# Patient Record
Sex: FEMALE | Race: WHITE | NOT HISPANIC OR LATINO | URBAN - METROPOLITAN AREA
[De-identification: names, ages, dates, MRNs, and addresses within clinical notes are randomized per-mention and may not be internally consistent; named-entity substitution may affect disease eponyms.]

---

## 2017-02-14 ENCOUNTER — APPOINTMENT (OUTPATIENT)
Dept: ULTRASOUND IMAGING | Facility: HOSPITAL | Age: 47
End: 2017-02-14
Attending: INTERNAL MEDICINE

## 2017-02-14 ENCOUNTER — APPOINTMENT (OUTPATIENT)
Dept: GENERAL RADIOLOGY | Facility: HOSPITAL | Age: 47
End: 2017-02-14

## 2017-02-14 ENCOUNTER — APPOINTMENT (OUTPATIENT)
Dept: CT IMAGING | Facility: HOSPITAL | Age: 47
End: 2017-02-14
Attending: EMERGENCY MEDICINE

## 2017-02-14 ENCOUNTER — HOSPITAL ENCOUNTER (OUTPATIENT)
Facility: HOSPITAL | Age: 47
Setting detail: OBSERVATION
Discharge: HOME | End: 2017-02-15
Attending: EMERGENCY MEDICINE | Admitting: HOSPITALIST

## 2017-02-14 DIAGNOSIS — J90 PLEURAL EFFUSION, LEFT: ICD-10-CM

## 2017-02-14 DIAGNOSIS — R07.89 LEFT-SIDED CHEST WALL PAIN: Primary | ICD-10-CM

## 2017-02-14 PROBLEM — R91.8 PULMONARY INFILTRATE ON CHEST X-RAY: Status: ACTIVE | Noted: 2017-02-14

## 2017-02-14 PROBLEM — R07.81 PLEURITIC CHEST PAIN: Status: ACTIVE | Noted: 2017-02-14

## 2017-02-14 PROBLEM — J84.10 CALCIFIED GRANULOMA OF LUNG (HCC): Status: ACTIVE | Noted: 2017-02-14

## 2017-02-14 LAB
ALBUMIN FLD-MCNC: 3.1 G/DL
ALBUMIN SERPL-MCNC: 4.3 G/DL (ref 3.5–5.2)
ALBUMIN/GLOB SERPL: 1.7 G/DL
ALP SERPL-CCNC: 55 U/L (ref 39–117)
ALT SERPL W P-5'-P-CCNC: 12 U/L (ref 1–33)
ANION GAP SERPL CALCULATED.3IONS-SCNC: 8.2 MMOL/L
APPEARANCE FLD: ABNORMAL
AST SERPL-CCNC: 13 U/L (ref 1–32)
BASOPHILS # BLD AUTO: 0.01 10*3/MM3 (ref 0–0.2)
BASOPHILS NFR BLD AUTO: 0.2 % (ref 0–1.5)
BILIRUB SERPL-MCNC: 0.2 MG/DL (ref 0.1–1.2)
BUN BLD-MCNC: 13 MG/DL (ref 6–20)
BUN/CREAT SERPL: 16.7 (ref 7–25)
CALCIUM SPEC-SCNC: 8.7 MG/DL (ref 8.6–10.5)
CHLORIDE SERPL-SCNC: 104 MMOL/L (ref 98–107)
CO2 SERPL-SCNC: 28.8 MMOL/L (ref 22–29)
COLOR FLD: ABNORMAL
CREAT BLD-MCNC: 0.78 MG/DL (ref 0.57–1)
D DIMER PPP FEU-MCNC: <0.27 MCGFEU/ML (ref 0–0.49)
DEPRECATED RDW RBC AUTO: 43.8 FL (ref 37–54)
EOSINOPHIL # BLD AUTO: 0.23 10*3/MM3 (ref 0–0.7)
EOSINOPHIL NFR BLD AUTO: 3.7 % (ref 0.3–6.2)
ERYTHROCYTE [DISTWIDTH] IN BLOOD BY AUTOMATED COUNT: 13.2 % (ref 11.7–13)
GFR SERPL CREATININE-BSD FRML MDRD: 79 ML/MIN/1.73
GLOBULIN UR ELPH-MCNC: 2.5 GM/DL
GLUCOSE BLD-MCNC: 155 MG/DL (ref 65–99)
GLUCOSE FLD-MCNC: 81 MG/DL
HCG SERPL QL: NEGATIVE
HCT VFR BLD AUTO: 39.1 % (ref 35.6–45.5)
HGB BLD-MCNC: 12.9 G/DL (ref 11.9–15.5)
HOLD SPECIMEN: NORMAL
HOLD SPECIMEN: NORMAL
IMM GRANULOCYTES # BLD: 0 10*3/MM3 (ref 0–0.03)
IMM GRANULOCYTES NFR BLD: 0 % (ref 0–0.5)
LDH FLD-CCNC: 227 U/L
LYMPHOCYTES # BLD AUTO: 1.66 10*3/MM3 (ref 0.9–4.8)
LYMPHOCYTES NFR BLD AUTO: 26.5 % (ref 19.6–45.3)
LYMPHOCYTES NFR FLD MANUAL: 26 %
MCH RBC QN AUTO: 30 PG (ref 26.9–32)
MCHC RBC AUTO-ENTMCNC: 33 G/DL (ref 32.4–36.3)
MCV RBC AUTO: 90.9 FL (ref 80.5–98.2)
METHOD: ABNORMAL
MONOCYTES # BLD AUTO: 0.46 10*3/MM3 (ref 0.2–1.2)
MONOCYTES NFR BLD AUTO: 7.3 % (ref 5–12)
MONOCYTES NFR FLD: 23 %
NEUTROPHILS # BLD AUTO: 3.9 10*3/MM3 (ref 1.9–8.1)
NEUTROPHILS NFR BLD AUTO: 62.3 % (ref 42.7–76)
NEUTROPHILS NFR FLD MANUAL: 51 %
NUC CELL # FLD: 6187 /MM3
PLATELET # BLD AUTO: 257 10*3/MM3 (ref 140–500)
PMV BLD AUTO: 10.5 FL (ref 6–12)
POTASSIUM BLD-SCNC: 3.8 MMOL/L (ref 3.5–5.2)
PROCALCITONIN SERPL-MCNC: 0.05 NG/ML (ref 0.1–0.25)
PROT SERPL-MCNC: 6.8 G/DL (ref 6–8.5)
RBC # BLD AUTO: 4.3 10*6/MM3 (ref 3.9–5.2)
RBC # FLD AUTO: ABNORMAL /MM3
SODIUM BLD-SCNC: 141 MMOL/L (ref 136–145)
TROPONIN T SERPL-MCNC: <0.01 NG/ML (ref 0–0.03)
WBC NRBC COR # BLD: 6.26 10*3/MM3 (ref 4.5–10.7)
WHOLE BLOOD HOLD SPECIMEN: NORMAL
WHOLE BLOOD HOLD SPECIMEN: NORMAL

## 2017-02-14 PROCEDURE — 84145 PROCALCITONIN (PCT): CPT | Performed by: EMERGENCY MEDICINE

## 2017-02-14 PROCEDURE — 76942 ECHO GUIDE FOR BIOPSY: CPT

## 2017-02-14 PROCEDURE — 87070 CULTURE OTHR SPECIMN AEROBIC: CPT | Performed by: HOSPITALIST

## 2017-02-14 PROCEDURE — 0 IOPAMIDOL PER 1 ML: Performed by: EMERGENCY MEDICINE

## 2017-02-14 PROCEDURE — 93010 ELECTROCARDIOGRAM REPORT: CPT | Performed by: INTERNAL MEDICINE

## 2017-02-14 PROCEDURE — 87015 SPECIMEN INFECT AGNT CONCNTJ: CPT | Performed by: HOSPITALIST

## 2017-02-14 PROCEDURE — 85379 FIBRIN DEGRADATION QUANT: CPT | Performed by: EMERGENCY MEDICINE

## 2017-02-14 PROCEDURE — 88305 TISSUE EXAM BY PATHOLOGIST: CPT | Performed by: INTERNAL MEDICINE

## 2017-02-14 PROCEDURE — 87206 SMEAR FLUORESCENT/ACID STAI: CPT | Performed by: HOSPITALIST

## 2017-02-14 PROCEDURE — 71275 CT ANGIOGRAPHY CHEST: CPT

## 2017-02-14 PROCEDURE — 82945 GLUCOSE OTHER FLUID: CPT | Performed by: HOSPITALIST

## 2017-02-14 PROCEDURE — 71020 HC CHEST PA AND LATERAL: CPT

## 2017-02-14 PROCEDURE — G0378 HOSPITAL OBSERVATION PER HR: HCPCS

## 2017-02-14 PROCEDURE — 82042 OTHER SOURCE ALBUMIN QUAN EA: CPT | Performed by: HOSPITALIST

## 2017-02-14 PROCEDURE — 99285 EMERGENCY DEPT VISIT HI MDM: CPT

## 2017-02-14 PROCEDURE — 83615 LACTATE (LD) (LDH) ENZYME: CPT | Performed by: HOSPITALIST

## 2017-02-14 PROCEDURE — 25010000002 KETOROLAC TROMETHAMINE PER 15 MG: Performed by: EMERGENCY MEDICINE

## 2017-02-14 PROCEDURE — 88112 CYTOPATH CELL ENHANCE TECH: CPT | Performed by: INTERNAL MEDICINE

## 2017-02-14 PROCEDURE — 80053 COMPREHEN METABOLIC PANEL: CPT | Performed by: EMERGENCY MEDICINE

## 2017-02-14 PROCEDURE — 87116 MYCOBACTERIA CULTURE: CPT | Performed by: HOSPITALIST

## 2017-02-14 PROCEDURE — 93005 ELECTROCARDIOGRAM TRACING: CPT

## 2017-02-14 PROCEDURE — 25010000002 MORPHINE PER 10 MG: Performed by: EMERGENCY MEDICINE

## 2017-02-14 PROCEDURE — 87205 SMEAR GRAM STAIN: CPT | Performed by: HOSPITALIST

## 2017-02-14 PROCEDURE — 89051 BODY FLUID CELL COUNT: CPT | Performed by: HOSPITALIST

## 2017-02-14 PROCEDURE — 84484 ASSAY OF TROPONIN QUANT: CPT | Performed by: EMERGENCY MEDICINE

## 2017-02-14 PROCEDURE — 85025 COMPLETE CBC W/AUTO DIFF WBC: CPT | Performed by: EMERGENCY MEDICINE

## 2017-02-14 PROCEDURE — 84703 CHORIONIC GONADOTROPIN ASSAY: CPT | Performed by: EMERGENCY MEDICINE

## 2017-02-14 RX ORDER — KETOROLAC TROMETHAMINE 30 MG/ML
30 INJECTION, SOLUTION INTRAMUSCULAR; INTRAVENOUS ONCE
Status: COMPLETED | OUTPATIENT
Start: 2017-02-14 | End: 2017-02-14

## 2017-02-14 RX ORDER — LIDOCAINE WITH 8.4% SOD BICARB 0.9%(10ML)
20 SYRINGE (ML) INJECTION ONCE
Status: COMPLETED | OUTPATIENT
Start: 2017-02-14 | End: 2017-02-14

## 2017-02-14 RX ORDER — MORPHINE SULFATE 2 MG/ML
2 INJECTION, SOLUTION INTRAMUSCULAR; INTRAVENOUS
Status: DISCONTINUED | OUTPATIENT
Start: 2017-02-14 | End: 2017-02-15

## 2017-02-14 RX ORDER — ACETAMINOPHEN 325 MG/1
650 TABLET ORAL EVERY 6 HOURS PRN
Status: DISCONTINUED | OUTPATIENT
Start: 2017-02-14 | End: 2017-02-15 | Stop reason: HOSPADM

## 2017-02-14 RX ORDER — NITROGLYCERIN 0.4 MG/1
0.4 TABLET SUBLINGUAL
Status: DISCONTINUED | OUTPATIENT
Start: 2017-02-14 | End: 2017-02-15 | Stop reason: HOSPADM

## 2017-02-14 RX ORDER — SODIUM CHLORIDE 0.9 % (FLUSH) 0.9 %
10 SYRINGE (ML) INJECTION AS NEEDED
Status: DISCONTINUED | OUTPATIENT
Start: 2017-02-14 | End: 2017-02-15 | Stop reason: HOSPADM

## 2017-02-14 RX ORDER — MORPHINE SULFATE 2 MG/ML
2 INJECTION, SOLUTION INTRAMUSCULAR; INTRAVENOUS ONCE
Status: COMPLETED | OUTPATIENT
Start: 2017-02-14 | End: 2017-02-14

## 2017-02-14 RX ORDER — ONDANSETRON 2 MG/ML
4 INJECTION INTRAMUSCULAR; INTRAVENOUS EVERY 6 HOURS PRN
Status: DISCONTINUED | OUTPATIENT
Start: 2017-02-14 | End: 2017-02-15 | Stop reason: HOSPADM

## 2017-02-14 RX ORDER — HYDROCODONE BITARTRATE AND ACETAMINOPHEN 5; 325 MG/1; MG/1
1 TABLET ORAL EVERY 4 HOURS PRN
Status: DISCONTINUED | OUTPATIENT
Start: 2017-02-14 | End: 2017-02-15 | Stop reason: HOSPADM

## 2017-02-14 RX ORDER — ASPIRIN 325 MG
325 TABLET ORAL ONCE
Status: COMPLETED | OUTPATIENT
Start: 2017-02-14 | End: 2017-02-14

## 2017-02-14 RX ORDER — NALOXONE HCL 0.4 MG/ML
0.4 VIAL (ML) INJECTION
Status: DISCONTINUED | OUTPATIENT
Start: 2017-02-14 | End: 2017-02-15

## 2017-02-14 RX ADMIN — KETOROLAC TROMETHAMINE 30 MG: 30 INJECTION, SOLUTION INTRAMUSCULAR at 08:06

## 2017-02-14 RX ADMIN — Medication 20 ML: at 14:51

## 2017-02-14 RX ADMIN — IOPAMIDOL 95 ML: 755 INJECTION, SOLUTION INTRAVENOUS at 08:36

## 2017-02-14 RX ADMIN — SODIUM CHLORIDE 1000 ML: 9 INJECTION, SOLUTION INTRAVENOUS at 08:07

## 2017-02-14 RX ADMIN — ASPIRIN 325 MG: 325 TABLET ORAL at 08:06

## 2017-02-14 RX ADMIN — HYDROCODONE BITARTRATE AND ACETAMINOPHEN 1 TABLET: 5; 325 TABLET ORAL at 22:41

## 2017-02-14 RX ADMIN — MORPHINE SULFATE 2 MG: 2 INJECTION, SOLUTION INTRAMUSCULAR; INTRAVENOUS at 10:27

## 2017-02-14 RX ADMIN — HYDROCODONE BITARTRATE AND ACETAMINOPHEN 1 TABLET: 5; 325 TABLET ORAL at 18:46

## 2017-02-14 NOTE — H&P
Name: Tiara Dickey ADMIT: 2017   : 1970  PCP: No Known Provider    MRN: [id number] LOS: 0 days   AGE/SEX: 47 y.o. female  ROOM: [facility]/     Chief Complaint   Patient presents with   • Chest Pain   • Shortness of Breath       Subjective   Patient is a 47 y.o. female presents with the following...    Chest Pain    This is a new problem. The current episode started today. The onset quality is sudden. The problem occurs intermittently. The problem has been unchanged. The pain is present in the lateral region (left chest/back). The pain is moderate. The quality of the pain is described as sharp and stabbing. Radiates to: Left scapula. Pertinent negatives include no cough, diaphoresis, exertional chest pressure, irregular heartbeat or nausea. The pain is aggravated by breathing, coughing and deep breathing. She has tried nothing for the symptoms. Risk factors: None. Past medical history comments: She had a respiratory infection in January that lasted less than a week and she didnt seek treatment for it.       History reviewed. No pertinent past medical history.  History reviewed. No pertinent past surgical history.  History reviewed. No pertinent family history.  Social History   Substance Use Topics   • Smoking status: Never Smoker   • Smokeless tobacco: None   • Alcohol use None     No prescriptions prior to admission.     Allergies:  Review of patient's allergies indicates no known allergies.    Review of Systems   Constitutional: Negative.  Negative for diaphoresis.   HENT: Negative.    Eyes: Negative.    Respiratory: Negative for cough.    Cardiovascular: Positive for chest pain.   Gastrointestinal: Negative.  Negative for nausea.   Endocrine: Negative.    Genitourinary: Negative.    Musculoskeletal: Negative.    Skin: Negative.    Neurological: Negative.    Hematological: Negative.    Psychiatric/Behavioral: Negative.         Objective    Vital Signs  Temp:  [98.2 °F (36.8 °C)-98.6 °F (37 °C)]  98.2 °F (36.8 °C)  Heart Rate:  [71-93] 88  Resp:  [16-18] 16  BP: (106-128)/(66-90) 112/71  SpO2:  [96 %-100 %] 96 %  on   ;   O2 Device: room air  Body mass index is 23.17 kg/(m^2).    Physical Exam   Constitutional: She is oriented to person, place, and time. She appears well-developed and well-nourished. No distress.   HENT:   Head: Normocephalic and atraumatic.   Eyes: Conjunctivae and EOM are normal. No scleral icterus.   Neck: Normal range of motion. Neck supple. No tracheal deviation present.   Cardiovascular: Normal rate and regular rhythm.    No murmur heard.  Chest wall normal to inspection and nontender to palpation.   Pulmonary/Chest: Effort normal and breath sounds normal. No respiratory distress. She has no wheezes. She has no rales.   Diminished breath sounds left base   Abdominal: Soft. Bowel sounds are normal. She exhibits no distension and no mass. There is no tenderness.   Musculoskeletal: Normal range of motion. She exhibits no edema or deformity.   Neurological: She is alert and oriented to person, place, and time. No cranial nerve deficit.   Skin: Skin is warm and dry. No rash noted. No erythema.   Psychiatric: Her behavior is normal. Judgment and thought content normal.   Slightly anxious   Nursing note and vitals reviewed.      Results Review:   I reviewed the patient's new clinical results.    Assessment/Plan     Principal Problem:    Pleural effusion, left  Active Problems:    Pleuritic chest pain    Pulmonary infiltrate on chest x-ray    Calcified granuloma of left lower lung      Assessment & Plan  Patient has no obvious signs of sepsis or even infection.  Pro-calcitonin reassuring.  Pleural effusion and associated pleurisy likely the cause of her chest pain.  She needs thoracentesis for diagnostic and therapeutic purposes.  This will be performed this afternoon and will then make further recommendations based on the studies.  Pulmonology will assist her workup.      I discussed the  patients findings and my recommendations with patient, family and consulting provider.          Tam Ortiz MD  [facility]  02/14/17  3:34 PM

## 2017-02-14 NOTE — ED NOTES
C/O left chest/shoulder pain which worsens with deep breath.     Kuldip Shaikh RN  02/14/17 0568

## 2017-02-14 NOTE — CONSULTS
[facility]    Reason for consult: pleural effusion    HPI:  Mrs. Stewart is a 46yo with left mid back pain that started this morning.  It is located on the left below the scapula and radiates to the left axilla.  Worse with movement and deep inspiration.  She has had similar symptoms in the past that resolved spontaneously.  CT chest shows left sided effusion and mild infiltrate, calcified granuloma and no pe or significant lymphadenopathy.  She thought this was a muscular spasm at first, but then she started having some tingling in her left arm and thought she might be having a heart attack so she called EMS.  She reports no hemoptysis, she reports pre-menopausal changes, and recently finshed cycle, but otherwise ROS is completely negative. No autoimmune diseases in family.    PMH:  Social History     Social History   • Marital status: Single     Spouse name: N/A   • Number of children: N/A   • Years of education: N/A     Social History Main Topics   • Smoking status: Never Smoker   • Smokeless tobacco: None   • Alcohol use None   • Drug use: No   • Sexual activity: Defer     Other Topics Concern   • None     Social History Narrative   • None     History reviewed. No pertinent family history.  MEDS: none  ALL: NKDA  ROS:  Constitutional: Negative for chills and fever.   HENT: Negative for sore throat and trouble swallowing.   Eyes: Negative for visual disturbance.   Respiratory: Positive for shortness of breath (secondary to pain with breathing). Negative for cough.   Cardiovascular: Negative for chest pain, palpitations and leg swelling.   Gastrointestinal: Negative for abdominal pain, diarrhea and vomiting.   Endocrine: Negative.   Genitourinary: Negative for decreased urine volume, dysuria and frequency.   Musculoskeletal: Positive for back pain (below left scapula). Negative for neck pain.   Skin: Negative for rash.   Allergic/Immunologic: Negative.   Neurological: Negative for syncope,  weakness, numbness and headaches.   Hematological: Negative.   Psychiatric/Behavioral: Negative.   All other systems reviewed and are negative.    Vital Sign Min/Max for last 24 hours  Temp  Min: 98.6 °F (37 °C)  Max: 98.6 °F (37 °C)   BP  Min: 109/73  Max: 128/90   Pulse  Min: 71  Max: 93   Resp  Min: 16  Max: 18   SpO2  Min: 98 %  Max: 100 %   No Data Recorded   Weight  Min: 135 lb (61.2 kg)  Max: 135 lb (61.2 kg)     GEN:  NAD, appears ill, obese, AxOx3  HEENT: PERRL, EOMI, no icterus, mmm, no jvd, trachea midline, neck supple  CHEST: decreased bilat, no wheezes, no crackles, no use of accessory muscles  CV: RRR, no m/g/r  ABD: soft, nt, nd +bs, no hepatosplenomegaly  EXT: no c/c/e  SKIN: no rashes, no xanthomas, nl turgor  LYMPH: no palpable cervical or supraclavicular lymphadenopathy  NEURO: CN 2-12 intact and symmetric bilaterally  PSYCH: nl affect, nl orientation, nl judgement, nl mood  MSK: no kyphoscoliosis, 5/5 strength ue and le bilaterally    Labs:  Bmp: other than glucose 155; normal  Trop 0.01  Wbc 6.26  hgb 12.9  plts 257    Ct chest: reviewed myself, as per HPI, dictated report by rads    A/P:  1. Left sided pleural effusion - will get diagnostic thoracentesis, I suspect viral pleurisy.  Will await studies, check viral respiratory panel.  2. Calcified pulmonary nodule -- no further follow up needed.  3. Infiltrate -- suspect associated with effusion  4. Shoulder pain       Thoracentesis ordered, will await studies, repeat cxr pa and lat in the morning.   D/w family and with Dr. Ortiz and with Dr. Upton

## 2017-02-14 NOTE — PLAN OF CARE
Problem: Patient Care Overview (Adult)  Goal: Plan of Care Review  Outcome: Ongoing (interventions implemented as appropriate)    02/14/17 1801   Coping/Psychosocial Response Interventions   Plan Of Care Reviewed With patient;family   Patient Care Overview   Progress improving   Outcome Evaluation   Outcome Summary/Follow up Plan hanny thoracentesis. no resp distress. requests pain med before shift change.         Problem: Pain, Acute (Adult)  Goal: Identify Related Risk Factors and Signs and Symptoms  Outcome: Ongoing (interventions implemented as appropriate)    02/14/17 1801   Pain, Acute   Related Risk Factors (Acute Pain) procedure/treatment   Signs and Symptoms (Acute Pain) verbalization of pain descriptors       Goal: Acceptable Pain Control/Comfort Level  Outcome: Ongoing (interventions implemented as appropriate)    02/14/17 1801   Pain, Acute (Adult)   Acceptable Pain Control/Comfort Level making progress toward outcome         Problem: Fall Risk (Adult)  Goal: Identify Related Risk Factors and Signs and Symptoms  Outcome: Ongoing (interventions implemented as appropriate)    02/14/17 1801   Fall Risk   Fall Risk: Related Risk Factors environment unfamiliar   Fall Risk: Signs and Symptoms presence of risk factors       Goal: Absence of Falls  Outcome: Ongoing (interventions implemented as appropriate)    02/14/17 1801   Fall Risk (Adult)   Absence of Falls making progress toward outcome

## 2017-02-14 NOTE — ED PROVIDER NOTES
EMERGENCY DEPARTMENT ENCOUNTER    CHIEF COMPLAINT  Chief Complaint: back pain  History given by: patient, daughter, mother  History limited by: none  Room Number: 664/1  PMD: No Known Provider      HPI:  Pt is a 47 y.o. female who presents to the ED via EMS complaining of left sided mid thoracic back pain onset this morning. The pt states the pain is located below her left scapula and radiates to her left axilla. She says the pain is worse with movement and breathing.   She denies cough, cold, fever, abd pain, and chills. No Known trauma. No recent illness , cough or cold.     Similar sx in the past that improved.    Denies smoking and drinking  Denies h/o DVT.       Duration:  Onset this morning  Onset: gradual  Timing: constant  Location: left side back just below scapula  Radiation: left axilla  Quality: sharp  Intensity/Severity: mild  Progression: unchanged  Associated Symptoms: none  Aggravating Factors: movement, breathing  Alleviating Factors: none  Previous Episodes: N/A  Treatment before arrival: none    PAST MEDICAL HISTORY  Active Ambulatory Problems     Diagnosis Date Noted   • No Active Ambulatory Problems     Resolved Ambulatory Problems     Diagnosis Date Noted   • No Resolved Ambulatory Problems     No Additional Past Medical History       PAST SURGICAL HISTORY  History reviewed. No pertinent past surgical history.    FAMILY HISTORY  History reviewed. No pertinent family history.    SOCIAL HISTORY  Social History     Social History   • Marital status: Single     Spouse name: N/A   • Number of children: N/A   • Years of education: N/A     Occupational History   • Not on file.     Social History Main Topics   • Smoking status: Never Smoker   • Smokeless tobacco: Not on file   • Alcohol use Not on file   • Drug use: No   • Sexual activity: Defer     Other Topics Concern   • Not on file     Social History Narrative   • No narrative on file       ALLERGIES  Review of patient's allergies indicates no  known allergies.    REVIEW OF SYSTEMS  Review of Systems   Constitutional: Negative for chills and fever.   HENT: Negative for sore throat and trouble swallowing.    Eyes: Negative for visual disturbance.   Respiratory: Positive for shortness of breath (secondary to pain with breathing). Negative for cough.    Cardiovascular: Negative for chest pain, palpitations and leg swelling.   Gastrointestinal: Negative for abdominal pain, diarrhea and vomiting.   Endocrine: Negative.    Genitourinary: Negative for decreased urine volume, dysuria and frequency.   Musculoskeletal: Positive for back pain (below left scapula). Negative for neck pain.   Skin: Negative for rash.   Allergic/Immunologic: Negative.    Neurological: Negative for syncope, weakness, numbness and headaches.   Hematological: Negative.    Psychiatric/Behavioral: Negative.    All other systems reviewed and are negative.      PHYSICAL EXAM  ED Triage Vitals   Temp Heart Rate Resp BP SpO2   02/14/17 0623 02/14/17 0612 02/14/17 0618 02/14/17 0612 02/14/17 0612   98.6 °F (37 °C) 80 16 128/90 98 %      Temp src Heart Rate Source Patient Position BP Location FiO2 (%)   02/14/17 0623 -- -- -- --   Tympanic           Physical Exam   Constitutional: She is oriented to person, place, and time. She appears distressed (pt appears to be in mild discomfort).   HENT:   Head: Normocephalic and atraumatic.   Mouth/Throat: Oropharynx is clear and moist.   Eyes: EOM are normal. Pupils are equal, round, and reactive to light.   Neck: Normal range of motion. Neck supple.   Cardiovascular: Normal rate, regular rhythm and intact distal pulses.    No murmur heard.  Pulmonary/Chest: Effort normal and breath sounds normal. No respiratory distress. She has no wheezes. She has no rales.   Abdominal: Soft. Bowel sounds are normal. She exhibits no distension. There is no tenderness. There is no rebound and no guarding.   Musculoskeletal: Normal range of motion. She exhibits tenderness.  She exhibits no deformity.   Reproducible pain with movement and deep breaths located below left scapula that radiates to left axilla.     No CVA tenderness.    Neurological: She is alert and oriented to person, place, and time.   Skin: Skin is warm and dry.   Nursing note and vitals reviewed.      LAB RESULTS  Lab Results (last 24 hours)     Procedure Component Value Units Date/Time    CBC & Differential [[id number]] Collected:  02/14/17 0613    Specimen:  Blood Updated:  02/14/17 0635    Narrative:       The following orders were created for panel order CBC & Differential.  Procedure                               Abnormality         Status                     ---------                               -----------         ------                     CBC Auto Differential[[id number]]         Abnormal            Final result                 Please view results for these tests on the individual orders.    Comprehensive Metabolic Panel [[id number]]  (Abnormal) Collected:  02/14/17 0613    Specimen:  Blood Updated:  02/14/17 0652     Glucose 155 (H) mg/dL      BUN 13 mg/dL      Creatinine 0.78 mg/dL      Sodium 141 mmol/L      Potassium 3.8 mmol/L      Chloride 104 mmol/L      CO2 28.8 mmol/L      Calcium 8.7 mg/dL      Total Protein 6.8 g/dL      Albumin 4.30 g/dL      ALT (SGPT) 12 U/L      AST (SGOT) 13 U/L      Alkaline Phosphatase 55 U/L      Total Bilirubin 0.2 mg/dL      eGFR Non African Amer 79 mL/min/1.73      Globulin 2.5 gm/dL      A/G Ratio 1.7 g/dL      BUN/Creatinine Ratio 16.7      Anion Gap 8.2 mmol/L     Troponin [[id number]]  (Normal) Collected:  02/14/17 0613    Specimen:  Blood Updated:  02/14/17 0652     Troponin T <0.010 ng/mL     Narrative:       Troponin T Reference Ranges:  Less than 0.03 ng/mL:    Negative for AMI  0.03 to 0.09 ng/mL:      Indeterminant for AMI  Greater than 0.09 ng/mL: Positive for AMI    hCG, Serum, Qualitative [[id number]]  (Normal) Collected:  02/14/17 0613    Specimen:  Blood Updated:   02/14/17 0647     HCG Qualitative Negative     CBC Auto Differential [[id number]]  (Abnormal) Collected:  02/14/17 0613    Specimen:  Blood Updated:  02/14/17 0635     WBC 6.26 10*3/mm3      RBC 4.30 10*6/mm3      Hemoglobin 12.9 g/dL      Hematocrit 39.1 %      MCV 90.9 fL      MCH 30.0 pg      MCHC 33.0 g/dL      RDW 13.2 (H) %      RDW-SD 43.8 fl      MPV 10.5 fL      Platelets 257 10*3/mm3      Neutrophil % 62.3 %      Lymphocyte % 26.5 %      Monocyte % 7.3 %      Eosinophil % 3.7 %      Basophil % 0.2 %      Immature Grans % 0.0 %      Neutrophils, Absolute 3.90 10*3/mm3      Lymphocytes, Absolute 1.66 10*3/mm3      Monocytes, Absolute 0.46 10*3/mm3      Eosinophils, Absolute 0.23 10*3/mm3      Basophils, Absolute 0.01 10*3/mm3      Immature Grans, Absolute 0.00 10*3/mm3     D-dimer, Quantitative [[id number]]  (Normal) Collected:  02/14/17 0613    Specimen:  Blood Updated:  02/14/17 0816     D-Dimer, Quantitative <0.27 MCGFEU/mL     Narrative:       The [employer]ago D-Dimer test used in conjunction with a clinical pretest probability (PTP) assessment model, has been approved by the FDA to rule out the presence of venous thromboembolism (VTE) in outpatients suspected of deep venous thrombosis (DVT) or pulmonary embolism (PE).     Procalcitonin [[id number]]  (Abnormal) Collected:  02/14/17 0948    Specimen:  Blood Updated:  02/14/17 1025     Procalcitonin 0.05 (L) ng/mL     Narrative:       As a Marker for Sepsis (Non-Neonates):   1. <0.5 ng/mL represents a low risk of severe sepsis and/or septic shock.  1. >2 ng/mL represents a high risk of severe sepsis and/or septic shock.    As a Marker for Lower Respiratory Tract Infections that require antibiotic therapy:  PCT on Admission     Antibiotic Therapy             6-12 Hrs later  > 0.5                Strongly Recommended            >0.25 - <0.5         Recommended  0.1 - 0.25           Discouraged                   Remeasure/reassess PCT  <0.1                 Strongly  "Discouraged          Remeasure/reassess PCT      As 28 day mortality risk marker: \"Change in Procalcitonin Result\" (> 80 % or <=80 %) if Day 0 (or Day 1) and Day 4 values are available. Refer to http://www.[facility]-pct-calculator.com/   Change in PCT <=80 %   A decrease of PCT levels below or equal to 80 % defines a positive change in PCT test result representing a higher risk for 28-day all-cause mortality of patients diagnosed with severe sepsis or septic shock.  Change in PCT > 80 %   A decrease of PCT levels of more than 80 % defines a negative change in PCT result representing a lower risk for 28-day all-cause mortality of patients diagnosed with severe sepsis or septic shock.                      I ordered the above labs and reviewed the results    RADIOLOGY  CT Angiogram Chest With Contrast   Preliminary Result   There is a small-to-moderate left pleural effusion with   overlying passive atelectasis. I suspect there is some additional   underlying infiltrate at the inferior left lower lobe. Remainder of the   imaging is otherwise unremarkable. There is a densely calcified   granuloma within the left lower lobe accounting for the nodular density   seen at today's chest radiograph.          XR Chest 2 View   Final Result      US Thoracentesis Left    (Results Pending)   XR Chest PA & Lateral    (Results Pending)        I ordered the above noted radiological studies. Interpreted by radiologist. Discussed with radiologist (Aroldo). Reviewed by me in PACS.       PROCEDURES  Procedures      PROGRESS AND CONSULTS  ED Course     7:49 AM  Ordered D-Dimer and CTA CHEST for further evaluation. Ordered IVF for hydration and Toradol for pain.     9:08 AM  Pt rechecked and is resting comfortably in NAD with O2sats of 99% and HR of 97, and BP of 109/73.   D/w pt labs and radiology results which showed mild to moderate pleural effusion.     Recommendation for admission was discussed for fluid draining and further evaluation " for possible infection.   Pt and family understand and agree with plan of care.     9:26 AM  Call returned by Dr. Ortiz (hospitalist) who recommends pulmonary consult and order Procal.   Ordered Procal for further evaluation.      9:45 AM  Call returned by Dr. Puentes who agrees to consult. Dr. Puentes agrees that no Abx should be given at this time.       MEDICAL DECISION MAKING  Results were reviewed/discussed with the patient and they were also made aware of online access. Pt also made aware that some labs, such as cultures, will not be resulted during ER visit and follow up with PMD is necessary.     MDM  Number of Diagnoses or Management Options  Left-sided chest wall pain:   Pleural effusion, left:      Amount and/or Complexity of Data Reviewed  Clinical lab tests: reviewed and ordered (GLU - 155)  Tests in the radiology section of CPT®: reviewed and ordered (CXR - left 14 mm indeterminate perihilar nodule seen. Patchy opacity seen, infiltrate  CT recommended.     Independently viewed by me. Interpreted by radiologist.    CTA CHEST - small calcified granuloma. Mild to moderate left pleural effusion with atelectasis and possible infiltrate in left base.      Independently viewed by me. Interpreted by radiologist. Discussed with Radiologist.     )  Tests in the medicine section of CPT®: reviewed and ordered (EKG           EKG time: 0613  Rhythm/Rate: 75, NSR  P waves and MD:   QRS, axis: narrow QRS, normal AXIS   ST and T waves:   No acute process     Interpreted Contemporaneously by me, independently viewed  No prior for comparison.  )  Discussion of test results with the performing providers: yes (Dr. Kendrick - radiology )  Discuss the patient with other providers: yes (Dr. Ortiz - hospitalist  Dr. Puentes - pulmonary)           DIAGNOSIS  Final diagnoses:   Left-sided chest wall pain   Pleural effusion, left       DISPOSITION  ADMIT    Latest Documented Vital Signs:  As of 2:59 PM  BP- 110/73 HR- 89 Temp- 98.2 °F  (36.8 °C) (Oral) O2 sat- 98%    --  Documentation assistance provided by susan Hobson for Dr. Upton.  Information recorded by the huongibsam was done at my direction and has been verified and validated by me.       Reynaldo Hobson  02/14/17 0920           Reynaldo Hobson  02/14/17 0950       Lewis Upton MD  02/14/17 5498

## 2017-02-15 ENCOUNTER — APPOINTMENT (OUTPATIENT)
Dept: CT IMAGING | Facility: HOSPITAL | Age: 47
End: 2017-02-15
Attending: INTERNAL MEDICINE

## 2017-02-15 ENCOUNTER — APPOINTMENT (OUTPATIENT)
Dept: GENERAL RADIOLOGY | Facility: HOSPITAL | Age: 47
End: 2017-02-15
Attending: INTERNAL MEDICINE

## 2017-02-15 VITALS
TEMPERATURE: 97.7 F | BODY MASS INDEX: 23.05 KG/M2 | OXYGEN SATURATION: 96 % | HEIGHT: 64 IN | SYSTOLIC BLOOD PRESSURE: 108 MMHG | DIASTOLIC BLOOD PRESSURE: 66 MMHG | HEART RATE: 77 BPM | RESPIRATION RATE: 16 BRPM | WEIGHT: 135 LBS

## 2017-02-15 PROBLEM — J90 EXUDATIVE PLEURAL EFFUSION: Status: ACTIVE | Noted: 2017-02-15

## 2017-02-15 PROBLEM — R07.81 PLEURITIC CHEST PAIN: Status: RESOLVED | Noted: 2017-02-14 | Resolved: 2017-02-15

## 2017-02-15 LAB
B PERT DNA SPEC QL NAA+PROBE: NOT DETECTED
BASOPHILS # BLD AUTO: 0.03 10*3/MM3 (ref 0–0.2)
BASOPHILS NFR BLD AUTO: 0.5 % (ref 0–1.5)
C PNEUM DNA NPH QL NAA+NON-PROBE: NOT DETECTED
DEPRECATED RDW RBC AUTO: 44.8 FL (ref 37–54)
EOSINOPHIL # BLD AUTO: 0.22 10*3/MM3 (ref 0–0.7)
EOSINOPHIL NFR BLD AUTO: 3.9 % (ref 0.3–6.2)
ERYTHROCYTE [DISTWIDTH] IN BLOOD BY AUTOMATED COUNT: 13.6 % (ref 11.7–13)
ERYTHROCYTE [SEDIMENTATION RATE] IN BLOOD: 10 MM/HR (ref 0–20)
FLUAV H1 2009 PAND RNA NPH QL NAA+PROBE: NOT DETECTED
FLUAV H1 HA GENE NPH QL NAA+PROBE: NOT DETECTED
FLUAV H3 RNA NPH QL NAA+PROBE: NOT DETECTED
FLUAV SUBTYP SPEC NAA+PROBE: NOT DETECTED
FLUBV RNA ISLT QL NAA+PROBE: NOT DETECTED
HADV DNA SPEC NAA+PROBE: NOT DETECTED
HCOV 229E RNA SPEC QL NAA+PROBE: NOT DETECTED
HCOV HKU1 RNA SPEC QL NAA+PROBE: NOT DETECTED
HCOV NL63 RNA SPEC QL NAA+PROBE: NOT DETECTED
HCOV OC43 RNA SPEC QL NAA+PROBE: NOT DETECTED
HCT VFR BLD AUTO: 33.4 % (ref 35.6–45.5)
HGB BLD-MCNC: 11.3 G/DL (ref 11.9–15.5)
HMPV RNA NPH QL NAA+NON-PROBE: NOT DETECTED
HPIV1 RNA SPEC QL NAA+PROBE: NOT DETECTED
HPIV2 RNA SPEC QL NAA+PROBE: NOT DETECTED
HPIV3 RNA NPH QL NAA+PROBE: NOT DETECTED
HPIV4 P GENE NPH QL NAA+PROBE: NOT DETECTED
IMM GRANULOCYTES # BLD: 0 10*3/MM3 (ref 0–0.03)
IMM GRANULOCYTES NFR BLD: 0 % (ref 0–0.5)
LDH SERPL-CCNC: 123 U/L (ref 135–214)
LYMPHOCYTES # BLD AUTO: 1.83 10*3/MM3 (ref 0.9–4.8)
LYMPHOCYTES NFR BLD AUTO: 32 % (ref 19.6–45.3)
M PNEUMO IGG SER IA-ACNC: NOT DETECTED
MCH RBC QN AUTO: 30.2 PG (ref 26.9–32)
MCHC RBC AUTO-ENTMCNC: 33.8 G/DL (ref 32.4–36.3)
MCV RBC AUTO: 89.3 FL (ref 80.5–98.2)
MONOCYTES # BLD AUTO: 0.6 10*3/MM3 (ref 0.2–1.2)
MONOCYTES NFR BLD AUTO: 10.5 % (ref 5–12)
NEUTROPHILS # BLD AUTO: 3.03 10*3/MM3 (ref 1.9–8.1)
NEUTROPHILS NFR BLD AUTO: 53.1 % (ref 42.7–76)
PLATELET # BLD AUTO: 242 10*3/MM3 (ref 140–500)
PMV BLD AUTO: 10.6 FL (ref 6–12)
RBC # BLD AUTO: 3.74 10*6/MM3 (ref 3.9–5.2)
RHINOVIRUS RNA SPEC NAA+PROBE: NOT DETECTED
RSV RNA NPH QL NAA+NON-PROBE: NOT DETECTED
WBC NRBC COR # BLD: 5.71 10*3/MM3 (ref 4.5–10.7)

## 2017-02-15 PROCEDURE — 87486 CHLMYD PNEUM DNA AMP PROBE: CPT | Performed by: INTERNAL MEDICINE

## 2017-02-15 PROCEDURE — 85025 COMPLETE CBC W/AUTO DIFF WBC: CPT | Performed by: INTERNAL MEDICINE

## 2017-02-15 PROCEDURE — 86235 NUCLEAR ANTIGEN ANTIBODY: CPT | Performed by: INTERNAL MEDICINE

## 2017-02-15 PROCEDURE — 87798 DETECT AGENT NOS DNA AMP: CPT | Performed by: INTERNAL MEDICINE

## 2017-02-15 PROCEDURE — 86430 RHEUMATOID FACTOR TEST QUAL: CPT | Performed by: INTERNAL MEDICINE

## 2017-02-15 PROCEDURE — 25510000001 DIATRIZOATE MEGLUMINE & SODIUM PER 1 ML: Performed by: INTERNAL MEDICINE

## 2017-02-15 PROCEDURE — 87633 RESP VIRUS 12-25 TARGETS: CPT | Performed by: INTERNAL MEDICINE

## 2017-02-15 PROCEDURE — G0378 HOSPITAL OBSERVATION PER HR: HCPCS

## 2017-02-15 PROCEDURE — 86200 CCP ANTIBODY: CPT | Performed by: INTERNAL MEDICINE

## 2017-02-15 PROCEDURE — 86225 DNA ANTIBODY NATIVE: CPT | Performed by: INTERNAL MEDICINE

## 2017-02-15 PROCEDURE — 0 IOPAMIDOL 61 % SOLUTION: Performed by: HOSPITALIST

## 2017-02-15 PROCEDURE — 85652 RBC SED RATE AUTOMATED: CPT | Performed by: INTERNAL MEDICINE

## 2017-02-15 PROCEDURE — 71020 HC CHEST PA AND LATERAL: CPT

## 2017-02-15 PROCEDURE — 83615 LACTATE (LD) (LDH) ENZYME: CPT | Performed by: INTERNAL MEDICINE

## 2017-02-15 PROCEDURE — 74177 CT ABD & PELVIS W/CONTRAST: CPT

## 2017-02-15 PROCEDURE — 87581 M.PNEUMON DNA AMP PROBE: CPT | Performed by: INTERNAL MEDICINE

## 2017-02-15 RX ADMIN — DIATRIZOATE MEGLUMINE AND DIATRIZOATE SODIUM 30 ML: 600; 100 SOLUTION ORAL; RECTAL at 12:45

## 2017-02-15 RX ADMIN — IOPAMIDOL 85 ML: 612 INJECTION, SOLUTION INTRAVENOUS at 14:57

## 2017-02-15 NOTE — PROGRESS NOTES
"      Name: Tiara Dickey ADMIT: 2017   : 1970  PCP: No Known Provider    MRN: [id number] LOS: 1 days   AGE/SEX: 47 y.o. female  ROOM: [facility]     Subjective   Chest pain much better.  Not short of breath.  No new complaints.    Objective   Vital Signs  Temp:  [98.1 °F (36.7 °C)-98.3 °F (36.8 °C)] 98.2 °F (36.8 °C)  Heart Rate:  [86-89] 86  Resp:  [16-18] 16  BP: ()/(64-73) 103/65  SpO2:  [95 %-98 %] 96 %  on   ;   O2 Device: room air  Body mass index is 23.17 kg/(m^2).    Objective:  General Appearance:  Comfortable and in no acute distress.    Vital signs: (most recent): Blood pressure 103/65, pulse 86, temperature 98.2 °F (36.8 °C), resp. rate 16, height 64\" (162.6 cm), weight 135 lb (61.2 kg), SpO2 96 %.    Lungs:  Normal effort.  Breath sounds clear to auscultation.    Heart: Normal rate.  Regular rhythm.    Abdomen: Abdomen is soft and non-distended.  Bowel sounds are normal.   There is no abdominal tenderness.     Extremities: There is no dependent edema.    Neurological: Patient is alert and oriented to person, place and time.    Skin:  Warm and dry.          Results Review:       I reviewed the patient's new clinical results.    Results from last 7 days  Lab Units 02/15/17  0915 17  0613   WBC 10*3/mm3 5.71 6.26   HEMOGLOBIN g/dL 11.3* 12.9   PLATELETS 10*3/mm3 242 257       Results from last 7 days  Lab Units 17  0613   SODIUM mmol/L 141   POTASSIUM mmol/L 3.8   CHLORIDE mmol/L 104   TOTAL CO2 mmol/L 28.8   BUN mg/dL 13   CREATININE mg/dL 0.78   GLUCOSE mg/dL 155*   Estimated Creatinine Clearance: 77 mL/min (by C-G formula based on Cr of 0.78).    Results from last 7 days  Lab Units 17  0613   CALCIUM mg/dL 8.7   ALBUMIN g/dL 4.30       US THORACENTESIS LEFT  2017   Impression:       1. Technically successful ultrasound guided thoracentesis.  (75 cc of grossly bloody fluid was withdrawn)    CTA CHEST WITH CONTRAST  17  Impression:       There is a " small-to-moderate left pleural effusion with  overlying passive atelectasis. I suspect there is some additional  underlying infiltrate at the inferior left lower lobe. Remainder of the  imaging is otherwise unremarkable. There is a densely calcified  granuloma within the left lower lobe accounting for the nodular density  seen at today's chest radiograph.    Assessment/Plan   Assessment:     Active Hospital Problems (** Indicates Principal Problem)    Diagnosis Date Noted   • **Pleural effusion, left [J90] 02/14/2017   • Exudative pleural effusion [J90] 02/15/2017   • Pulmonary infiltrate on chest x-ray [R91.8] 02/14/2017   • Calcified granuloma of left lower lung [J84.10] 02/14/2017      Resolved Hospital Problems    Diagnosis Date Noted Date Resolved   • Pleuritic chest pain [R07.81] 02/14/2017 02/15/2017       Plan:   Discussed with Dr. Matthew Puentes.  Studies concerning for exudative process.  Hopefully still associated with viral or bacterial illness in January.  Certainly needs outpatient follow-up.  Considering CT scan chest, abdomen and pelvis prior to discharge.  Will defer to Dr. Puentes.  If no further intervention planned possible discharge this afternoon.          Tam Ortiz MD  [facility]  02/15/17  12:00 PM

## 2017-02-15 NOTE — PLAN OF CARE
Problem: Patient Care Overview (Adult)  Goal: Plan of Care Review    02/15/17 1721   Coping/Psychosocial Response Interventions   Plan Of Care Reviewed With patient   Outcome Evaluation   Outcome Summary/Follow up Plan pt went for cxr and ct. awaiting ct results and decision of Dr. Puentes for pt to go home. viral panel sent to lab. vs are stable. no acute distres noted. will continue to monitor.       Goal: Adult Individualization and Mutuality  Outcome: Ongoing (interventions implemented as appropriate)    Problem: Pain, Acute (Adult)  Goal: Acceptable Pain Control/Comfort Level  Outcome: Ongoing (interventions implemented as appropriate)    Problem: Fall Risk (Adult)  Goal: Absence of Falls  Outcome: Ongoing (interventions implemented as appropriate)

## 2017-02-15 NOTE — PROGRESS NOTES
[facility]     Mar/chart reviewed  F/u pleural effusion: her pain is much better.  75ml of bloody fluid removed yesterday    Vital Sign Min/Max for last 24 hours  Temp  Min: 98.1 °F (36.7 °C)  Max: 98.3 °F (36.8 °C)   BP  Min: 98/64  Max: 125/68   Pulse  Min: 86  Max: 89   Resp  Min: 16  Max: 18   SpO2  Min: 95 %  Max: 98 %   No Data Recorded   No Data Recorded     Nad, axox3,   perrl, eomi, no icterus,  mmm, no jvd, trachea midline, neck supple,  chest cta bilaterally, no crackles, no wheezes,   rrr,   soft, nt, nd +bs,  no c/c/ e    Labs:  Serum ldh 123  Wbc 5.7  hgb 11.3  plts 242  Albumin 3.1  Fluid lots of RBC  Fluid ldh 227  Glucose 81    Repeat cxr: reviewed    A/P:  1. Pleural effusion -- bloody exudate; cytology is pending. Given high number of rbc's I think it wise to check ct abd/pelvis for any evidence of malignacy. Additionally, given low glucose, will check rheumatoid serology. If ct abd/pelvis is okay she can go home and follow up in the office with me in 2 weeks with cxr  2. Chest pain  3. Calcified granuloma  4. Meseret menopausal    D/w Dr. Ortiz and with multiple family members present at beside

## 2017-02-15 NOTE — PLAN OF CARE
Problem: Patient Care Overview (Adult)  Goal: Plan of Care Review  Outcome: Outcome(s) achieved Date Met:  02/15/17    02/15/17 1848   Coping/Psychosocial Response Interventions   Plan Of Care Reviewed With patient   Patient Care Overview   Progress improving   Outcome Evaluation   Outcome Summary/Follow up Plan pt ready for discharge       Goal: Adult Individualization and Mutuality  Outcome: Outcome(s) achieved Date Met:  02/15/17    Problem: Pain, Acute (Adult)  Goal: Acceptable Pain Control/Comfort Level  Outcome: Outcome(s) achieved Date Met:  02/15/17    Problem: Fall Risk (Adult)  Goal: Absence of Falls  Outcome: Outcome(s) achieved Date Met:  02/15/17

## 2017-02-15 NOTE — PROGRESS NOTES
Discharge Planning Assessment  [facility]     Patient Name: Tiara Dickey  MRN: [id number]  Today's Date: 2/15/2017    Admit Date: 2/14/2017          Discharge Needs Assessment       02/15/17 1213    Living Environment    Lives With child(gael), dependent    Living Arrangements house    Provides Primary Care For no one    Quality Of Family Relationships supportive    Able to Return to Prior Living Arrangements yes    Discharge Needs Assessment    Readmission Within The Last 30 Days no previous admission in last 30 days    Anticipated Changes Related to Illness none    Equipment Currently Used at Home none    Equipment Needed After Discharge none    Transportation Available car;family or friend will provide            Discharge Plan       02/15/17 1214    Case Management/Social Work Plan    Plan Home with daughter and mother, no needs    Patient/Family In Agreement With Plan yes    Additional Comments S/W patient at bedside and verified face sheet.  Patient lives at home with her daughter and plans to return home, at d/c.  Patient drives, uses no equipment and does not foresee needing any.  Patient is able to drive and get to physician appointments.  Patient states that she does not have a PCP and declined referral line #, explained that she needed another physician than a GYN to assess her over all care and she was not interested.  Patient uses [facility] at [place] and has no problems getting medications.  Will continue to monitor and assist, as needed.  Mannie, RN, CCP        Discharge Placement     No information found                Demographic Summary       02/15/17 1212    Referral Information    Admission Type inpatient    Arrived From home or self-care    Referral Source admission list    Reason For Consult discharge planning    Record Reviewed plan of care    Contact Information    Permission Granted to Share Information With family/designee   motherAmara [phone]    Primary Care  Physician Information    Name No PCP, has a GYN only            Functional Status       02/15/17 1213    Functional Status Current    Ambulation 0-->independent    Transferring 0-->independent    Toileting 0-->independent    Bathing 0-->independent    Dressing 0-->independent    Eating 0-->independent    Communication 0-->understands/communicates without difficulty    Swallowing (if score 2 or more for any item, consult Rehab Services) 0-->swallows foods/liquids without difficulty    Functional Status Prior    Ambulation 0-->independent    Transferring 0-->independent    Toileting 0-->independent    Bathing 0-->independent    Dressing 0-->independent    Eating 0-->independent    Communication 0-->understands/communicates without difficulty    Swallowing 0-->swallows foods/liquids without difficulty    IADL    Medications independent    Meal Preparation independent    Housekeeping independent    Laundry independent    Shopping independent    Oral Care independent    Activity Tolerance    Current Activity Limitations none    Cognitive/Perceptual/Developmental    Current Mental Status/Cognitive Functioning no deficits noted            Psychosocial     None            Abuse/Neglect     None            Legal     None            Substance Abuse     None            Patient Forms     None          Felicia Can RN

## 2017-02-15 NOTE — DISCHARGE SUMMARY
Name: Tiara Dickey ADMIT: 2017   : 1970  PCP: No Known Provider    MRN: [id number] LOS: 1 days   AGE/SEX: 47 y.o. female  ROOM: 4/     Date of Admission: 2017  Date of Discharge:  2/15/2017    PCP: No Known Provider    CHIEF COMPLAINT  Chest Pain and Shortness of Breath      DISCHARGE DIAGNOSIS  Active Hospital Problems (** Indicates Principal Problem)    Diagnosis Date Noted   • **Pleural effusion, left [J90] 2017   • Exudative pleural effusion [J90] 02/15/2017   • Pulmonary infiltrate on chest x-ray [R91.8] 2017   • Calcified granuloma of left lower lung [J84.10] 2017      Resolved Hospital Problems    Diagnosis Date Noted Date Resolved   • Pleuritic chest pain [R07.81] 2017 02/15/2017       SECONDARY DIAGNOSES  History reviewed. No pertinent past medical history.    CONSULTS   Consults     Date and Time Order Name Status Description    2017 1149 Inpatient Consult to Pulmonology      2017 0929 Pulmonology (on-call MD unless specified) Completed     2017 0913 [facility] (on-call MD unless specified) Completed           PROCEDURES PERFORMED  CT  ABD/PELVIS  WITH CONTRAST   2/15/2017  Pending    US THORACENTESIS LEFT 2017   Impression:       1. Technically successful ultrasound guided thoracentesis.  (75 cc of grossly bloody fluid was withdrawn)     CTA CHEST WITH CONTRAST 17  Impression:       There is a small-to-moderate left pleural effusion with  overlying passive atelectasis. I suspect there is some additional  underlying infiltrate at the inferior left lower lobe. Remainder of the  imaging is otherwise unremarkable. There is a densely calcified  granuloma within the left lower lobe accounting for the nodular density  seen at today's chest radiograph.    HOSPITAL COURSE  Patient is a 47 y.o. female presented to [facility] complaining of pleuritic chest pain.  Please see the admitting history and physical for further  "details.  CT scan chest was negative for pulmonary embolus but did show left-sided pleural effusion and calcified granuloma.  She was admitted to the hospital for further evaluation and workup.  She was seen by Dr. Matthew Puentes who ordered or centesis.  She had 75 cc of bloody effusion drained last evening.  Today she feels much better and has no further pain.  She is not short of breath.  Studies seem consistent with exudate.  Further evaluation is required.  CT scan abdomen and pelvis was done today and results are pending at time of this dictation.  She'll need follow-up with Dr. Puentes outpatient for repeat imaging in 2 weeks.      Her discharge today is pending review of CT scan and Dr. Matthew Puentes's approval.  If patient stays additional night in hospital, addendum to this summary will be performed at time of discharge.      VITAL SIGNS  Visit Vitals   • /66 (BP Location: Left arm, Patient Position: Lying)   • Pulse 77   • Temp 97.7 °F (36.5 °C) (Oral)   • Resp 16   • Ht 64\" (162.6 cm)   • Wt 135 lb (61.2 kg)   • SpO2 96%   • BMI 23.17 kg/m2     Objective:  General Appearance:  Comfortable and in no acute distress.    Vital signs: (most recent): Blood pressure 108/66, pulse 77, temperature 97.7 °F (36.5 °C), temperature source Oral, resp. rate 16, height 64\" (162.6 cm), weight 135 lb (61.2 kg), SpO2 96 %.    Lungs:  Normal effort.  Breath sounds clear to auscultation.    Heart: Normal rate.  Regular rhythm.    Abdomen: Abdomen is soft and non-distended.  Bowel sounds are normal.   There is no abdominal tenderness.     Extremities: There is no dependent edema.    Neurological: Patient is alert and oriented to person, place and time.    Skin:  Warm and dry.          CONDITION ON DISCHARGE  Stable.      DISCHARGE DISPOSITION   Home or Self Care      DISCHARGE MEDICATIONS  There are no discharge medications for this patient.     Diet Instructions     Diet:       Diet Texture / Consistency:  Regular        "         Activity Instructions     Activity as Tolerated                 No future appointments.  Referrals and Follow-ups to Schedule     Additional follow-up    As directed    No Known Provider  [facility] [id number]   Follow Up Details:  1 to 2 weeks (or sooner if problems)       Additional follow-up    As directed    Follow Up Details:  Dr. Matthew Puentes 2 weeks.             Follow-up Information     Follow up with No Known Provider .    Contact information:    [place] [phone]            TEST  RESULTS PENDING AT DISCHARGE  Order Current Status    GIAN Comprehensive Panel In process    Cyclic Citrul Peptide Antibody, IgG / IgA In process    Non-gynecologic Cytology In process    Rheumatoid Arthritis Expanded Panel In process    AFB Culture Preliminary result    Body Fluid Culture Preliminary result             Tam Ortiz MD  [facility]  02/15/17  4:29 PM        Dragon disclaimer:  Much of this encounter note is an electronic transcription/translation of spoken language to printed text. The electronic translation of spoken language may permit erroneous, or at times, nonsensical words or phrases to be inadvertently transcribed; Although I have reviewed the note for such errors, some may still exist.

## 2017-02-15 NOTE — PLAN OF CARE
Problem: Patient Care Overview (Adult)  Goal: Plan of Care Review  Outcome: Ongoing (interventions implemented as appropriate)    02/15/17 0351   Coping/Psychosocial Response Interventions   Plan Of Care Reviewed With patient   Patient Care Overview   Progress improving   Outcome Evaluation   Outcome Summary/Follow up Plan Patient being monitored post thoracentesis. Dressing remain intact with no drainage noted. Patient still has pain at site and with breathing. Medicated for pain with some relief. No respiratory distress noted at this time.        Goal: Adult Individualization and Mutuality  Outcome: Ongoing (interventions implemented as appropriate)  Goal: Discharge Needs Assessment  Outcome: Ongoing (interventions implemented as appropriate)    Problem: Pain, Acute (Adult)  Goal: Identify Related Risk Factors and Signs and Symptoms  Outcome: Outcome(s) achieved Date Met:  02/15/17  Goal: Acceptable Pain Control/Comfort Level  Outcome: Ongoing (interventions implemented as appropriate)    Problem: Fall Risk (Adult)  Goal: Identify Related Risk Factors and Signs and Symptoms  Outcome: Outcome(s) achieved Date Met:  02/15/17  Goal: Absence of Falls  Outcome: Ongoing (interventions implemented as appropriate)

## 2017-02-16 LAB
CENTROMERE B AB SER-ACNC: <0.2 AI (ref 0–0.9)
CHROMATIN AB SERPL-ACNC: <0.2 AI (ref 0–0.9)
CYTO UR: NORMAL
DSDNA AB SER-ACNC: 3 IU/ML (ref 0–9)
ENA JO1 AB SER-ACNC: <0.2 AI (ref 0–0.9)
ENA RNP AB SER-ACNC: <0.2 AI (ref 0–0.9)
ENA SCL70 AB SER-ACNC: <0.2 AI (ref 0–0.9)
ENA SM AB SER-ACNC: <0.2 AI (ref 0–0.9)
ENA SS-A AB SER-ACNC: <0.2 AI (ref 0–0.9)
ENA SS-B AB SER-ACNC: <0.2 AI (ref 0–0.9)
LAB AP CASE REPORT: NORMAL
Lab: NORMAL
Lab: NORMAL
PATH REPORT.FINAL DX SPEC: NORMAL
PATH REPORT.GROSS SPEC: NORMAL

## 2017-02-16 NOTE — PROGRESS NOTES
Continued Stay Note  [facility]     Patient Name: Tiara Dickey  MRN: [id number]  Today's Date: 2/16/2017    Admit Date: 2/14/2017          Discharge Plan       02/16/17 0823    Case Management/Social Work Plan    Plan Home with daughter and mother, no needs    Final Note    Final Note d/c'd last pm home with daughter and mother, no needs.  Mannie RN, CCP              Discharge Codes       02/16/17 0823    Discharge Codes    Discharge Codes 01  Discharge to home        Expected Discharge Date and Time     Expected Discharge Date Expected Discharge Time    Feb 15, 2017             Felicia Can RN

## 2017-02-17 LAB
CCP IGA+IGG SERPL IA-ACNC: 4 UNITS (ref 0–19)
CCP IGA+IGG SERPL IA-ACNC: 5 UNITS (ref 0–19)
CRP SERPL-MCNC: 1.1 MG/L (ref 0–4.9)
ERYTHROCYTE [SEDIMENTATION RATE] IN BLOOD BY WESTERGREN METHOD: 5 MM/HR (ref 0–32)
RA LATEX TURBID: 10.1 IU/ML (ref 0–13.9)

## 2017-02-19 LAB
BACTERIA FLD CULT: NORMAL
GRAM STN SPEC: NORMAL

## 2017-03-28 LAB
CHYMOTRYP AB SER-ACNC: NORMAL
NIGHT BLUE STAIN TISS: NORMAL